# Patient Record
Sex: MALE | Race: WHITE | URBAN - METROPOLITAN AREA
[De-identification: names, ages, dates, MRNs, and addresses within clinical notes are randomized per-mention and may not be internally consistent; named-entity substitution may affect disease eponyms.]

---

## 2019-03-13 ENCOUNTER — OFFICE VISIT (OUTPATIENT)
Dept: FAMILY MEDICINE CLINIC | Age: 24
End: 2019-03-13

## 2019-03-13 VITALS
WEIGHT: 132 LBS | DIASTOLIC BLOOD PRESSURE: 60 MMHG | TEMPERATURE: 98.5 F | HEIGHT: 67 IN | BODY MASS INDEX: 20.72 KG/M2 | OXYGEN SATURATION: 97 % | SYSTOLIC BLOOD PRESSURE: 108 MMHG | RESPIRATION RATE: 16 BRPM

## 2019-03-13 DIAGNOSIS — Z02.5 SPORTS PHYSICAL: Primary | ICD-10-CM

## 2019-03-13 NOTE — PROGRESS NOTES
SUBJECTIVE:    Kirt Park is a 26 y.o. male who presents for a pre-participation physical.     1. Chest pain, shortness of breath or wheezing with exercise: None  2. Syncope with exercise: None  3. History of heart murmur or HTN: None  4. Concussions or head injury: Yes, one in Feb 2014, one in June 2018, both soccer-related; currently no issues  5. History of Seizure: None  6. Heat illness: None  7. Disqualifications or restrictions from sports participation in the past: None  8. Injuries to muscle, tendon, or ligament: Pulled R hamstring Mar 2018; R MCL sprain Feb 2017  9. Fractured bone: Left 4th metacarpal Fx July 2017; B/L clavicle Fx (one at age 16, other in 2013)  8. Stress fracture: None  11. Ongoing medical conditions: None  12. Ever needed an inhaler for exercise: No  13. Sickle Cell disease or trait: None  14. Surgeries: Septoplasty in Dec 2017, [place] teeth removal in Dec 2016  15. Any unpaired organs: None  16. Vision impairment: None   17. Glasses or Contacts: Yes, contacts  18. Hearing impairment: None  19. Weight changes: None   20. Trying to gain/lose weight: No    Family History:  1. CAD, MI, or sudden death before the age of 48: No  2. HTN: No  3. Diabetes: No  4. Asthma: No  5. Sickle cell trait or disease: No  6. Pacemaker/defibrillator: maternal GM in her 77s  5. Cancer: maternal GM w/ breast cancer    History reviewed. No pertinent past medical history. Right shoulder injury, possibly torn labrum (never got MRI to confirm due to being relatively asymptomatic), currently no issues except occasionally with cross-body motions while lifting weights; no issues during soccer. No Known Allergies    OBJECTIVE:   Visit Vitals  /60 (BP 1 Location: Left arm, BP Patient Position: Sitting)   Temp 98.5 °F (36.9 °C) (Oral)   Resp 16   Ht 5' 7\" (1.702 m)   Wt 132 lb (59.9 kg)   SpO2 97%   BMI 20.67 kg/m²     General: Alert and oriented, in no acute distress. Well nourished. SKIN: No rash. No suspicious lesions or moles. EYE: PERRL. Sclera and conjuctival clear. Extraocular movements intact. EARS: External normal, canals clear, tympanic membranes normal.   NOSE: Mucosa healthy without drainage or ulceration. OROPHARYNX: No suspicious lesions, normal dentition, pharynx, tongue and tonsils normal.  NECK: Supple; no masses; thyroid normal.  LUNGS: Respirations unlabored; clear to auscultation bilaterally. CARDIOVASCULAR: Regular, rate, and rhythm without murmurs, gallops or rubs. ABDOMEN: Soft; nontender; nondistended; normoactive bowel sounds; no masses or organomegaly. LYMPH NODES: No significant cervical or inguinal lymphadenopathy. MUSCULOSKELETAL. NECK: FROM without pain. No cervical vertebral tenderness. BACK: FROM without pain. No obvious deformity. No vertebral tenderness. SHOULDER: FROM without pain. No AC, SC, or clavicle tenderness. RTC and deltoid strength 5/5 bilaterally. No joint laxity detected. ELBOW: FROM without pain. Flexion and extension strength 5/5 bilaterally. WRIST/HAND/FINGERS: FROM without pain.  strength 5/5 bilaterally. Wrist extension and flexion strength 5/5 bilaterally. HIP: FROM without pain. Flexion, extension, abduction, adduction strength 5/5 bilaterally. KNEE: FROM without pain. Flexion and extension strength 5/5 bilaterally. No joint laxity detected. ANKLE: FROM without pain. Flexion, extension, inversion, and eversion strength 5/5 bilaterally. No joint laxity detected. EXT: No edema. Neurovascularly intact. Normal gait. ASSESSMENT:  Preparticipation physical exam demonstrates no reason for disqualification or restrictions. PLAN:    1. Patient is able to participate in physical activity without any restrictions.